# Patient Record
Sex: MALE | URBAN - METROPOLITAN AREA
[De-identification: names, ages, dates, MRNs, and addresses within clinical notes are randomized per-mention and may not be internally consistent; named-entity substitution may affect disease eponyms.]

---

## 2020-03-06 ENCOUNTER — OFFICE VISIT (OUTPATIENT)
Dept: URBAN - METROPOLITAN AREA CLINIC 17 | Facility: CLINIC | Age: 75
End: 2020-03-06
Payer: MEDICARE

## 2020-03-06 PROCEDURE — 76514 ECHO EXAM OF EYE THICKNESS: CPT | Performed by: OPHTHALMOLOGY

## 2020-03-06 PROCEDURE — 92133 CPTRZD OPH DX IMG PST SGM ON: CPT | Performed by: OPHTHALMOLOGY

## 2020-03-06 PROCEDURE — 92004 COMPRE OPH EXAM NEW PT 1/>: CPT | Performed by: OPHTHALMOLOGY

## 2020-03-06 PROCEDURE — 92020 GONIOSCOPY: CPT | Performed by: OPHTHALMOLOGY

## 2020-03-06 RX ORDER — PREDNISOLONE ACETATE 10 MG/ML
1 % SUSPENSION/ DROPS OPHTHALMIC
Qty: 1 | Refills: 1 | Status: INACTIVE
Start: 2020-03-06 | End: 2020-07-13

## 2020-03-06 RX ORDER — OFLOXACIN 3 MG/ML
0.3 % SOLUTION/ DROPS OPHTHALMIC
Qty: 1 | Refills: 1 | Status: INACTIVE
Start: 2020-03-06 | End: 2020-07-13

## 2020-03-06 ASSESSMENT — INTRAOCULAR PRESSURE
OD: 21
OS: 28

## 2020-03-06 NOTE — IMPRESSION/PLAN
Impression: Primary open-angle glaucoma, bilateral, moderate stage: B74.0884. Plan: Pt has Glaucoma    Gonio :  cbb 2+ pg ou       Pachs:   580/550   Today's IOP :    21, 28 Target IOP low to mid teens Pt denies Fhx of Glaucoma Left eye is the better seeing eye No VF today - will warrant in the future C/D:  .8-.8 / .9-.9 OCT: (3/6/2020) 75, 54 Pt denies Sulfa Allergy   // Pt denies Lung /Heart dx Pt is currently using :  Lumigan QHS OU, Dorzo/Timolol BID OU, Brimonidine BID O No previous medications used Plan :
1. Cont : 
- Lumigan QHS OU - Dorzol/Timolol BID OU
- Brimonidine BID OS 2. Patient presents today transitioning into care from Dr Alberto Burrell. Based on today's exam and patients IOP recommend additional treatment. 3. Baerveldt Tube Shunt vs XEN implant was discussed. Risks/benefits from each procedure was explained to patient and wife and were understood. The Patient is aware that the risks of Baerveldt tube shunt include but are not limited to complete blindness , loss of vision and loss of the eye, bleeding, infection, inflammation, Hypotony, Persistent IOP elevation, intractable diplopia , and orbital or ocular inflammation. The Patient is aware that there is a 10 % chance of permanent double vision which cannot be remedied by any means including glasses or strabismus surgery. The patient is aware that failure to lower IOP will likely lead to progressive sight loss and possibly blindness . Pt understands. * 50 minutes/points with turn over **PCC's Please notify OR to order device 4.  Schedule Baerveldt  OS  ****Risk Level: 2****

## 2020-04-23 ENCOUNTER — OFFICE VISIT (OUTPATIENT)
Dept: URBAN - METROPOLITAN AREA CLINIC 17 | Facility: CLINIC | Age: 75
End: 2020-04-23
Payer: MEDICARE

## 2020-04-23 PROCEDURE — 92083 EXTENDED VISUAL FIELD XM: CPT | Performed by: OPHTHALMOLOGY

## 2020-07-28 ENCOUNTER — SURGERY (OUTPATIENT)
Dept: URBAN - METROPOLITAN AREA SURGERY 11 | Facility: SURGERY | Age: 75
End: 2020-07-28
Payer: MEDICARE

## 2020-07-28 PROCEDURE — 66180 AQUEOUS SHUNT EYE W/GRAFT: CPT | Performed by: OPHTHALMOLOGY

## 2020-07-29 ENCOUNTER — POST-OPERATIVE VISIT (OUTPATIENT)
Dept: URBAN - METROPOLITAN AREA CLINIC 23 | Facility: CLINIC | Age: 75
End: 2020-07-29
Payer: MEDICARE

## 2020-07-29 DIAGNOSIS — Z48.810 ENCNTR FOR SURGICAL AFTCR FOL SURGERY ON THE SENSE ORGANS: ICD-10-CM

## 2020-07-29 DIAGNOSIS — Z09 ENCNTR FOR F/U EXAM AFT TRTMT FOR COND OTH THAN MALIG NEOPLM: Primary | ICD-10-CM

## 2020-07-29 PROCEDURE — 99024 POSTOP FOLLOW-UP VISIT: CPT | Performed by: OPTOMETRIST

## 2020-07-29 ASSESSMENT — INTRAOCULAR PRESSURE
OD: 17
OS: 24

## 2020-08-05 ENCOUNTER — POST-OPERATIVE VISIT (OUTPATIENT)
Dept: URBAN - METROPOLITAN AREA CLINIC 23 | Facility: CLINIC | Age: 75
End: 2020-08-05
Payer: MEDICARE

## 2020-08-05 PROCEDURE — 99024 POSTOP FOLLOW-UP VISIT: CPT | Performed by: OPHTHALMOLOGY

## 2020-08-05 RX ORDER — ERYTHROMYCIN 5 MG/G
OINTMENT OPHTHALMIC
Qty: 1 | Refills: 0 | Status: INACTIVE
Start: 2020-08-05 | End: 2020-08-18

## 2020-08-05 ASSESSMENT — INTRAOCULAR PRESSURE: OS: 23

## 2020-08-05 NOTE — IMPRESSION/PLAN
Impression: Primary open-angle glaucoma, bilateral, moderate stage: H40.1132.
-- Baerveldt Tube OS 0- 07/28/2020 - Dr Milagros Chris - Status: Not open  Plan: Pt has Glaucoma    Gonio :  cbb 2+ pg ou       Pachs:   580/550   Today's IOP :     23 OS Target IOP low to mid teens Pt denies Fhx of Glaucoma Left eye is the better seeing eye No VF today - will warrant in the future C/D:  .8-.8 / .9-.9 OCT: (3/6/2020) 75, 54 Pt denies Sulfa Allergy   // Pt denies Lung /Heart dx Pt is currently using :  Lumigan QHS OU, Dorzo/Timolol BID OU, Brimonidine BID O No previous medications used Plan :
1. Cont : 
- Lumigan QHS OU - Dorzol/Timolol BID OU
- Brimonidine BID OS
- Prednisolone QID OS
- STOP Ofloxacin 2. Patient is doing well post Baerveldt Tube ; healing well and will 3. Post-Op instructions were reviewed with patient and wife. No heavy lifting bending or straining for 3 more weeks. No rubbing the upper lid. Emphasized compliance with drops and instructions.  
4. Return in 3 weeks

## 2020-08-18 ENCOUNTER — POST-OPERATIVE VISIT (OUTPATIENT)
Dept: URBAN - METROPOLITAN AREA CLINIC 23 | Facility: CLINIC | Age: 75
End: 2020-08-18
Payer: MEDICARE

## 2020-08-18 PROCEDURE — 99024 POSTOP FOLLOW-UP VISIT: CPT | Performed by: OPHTHALMOLOGY

## 2020-08-18 ASSESSMENT — INTRAOCULAR PRESSURE: OS: 19

## 2020-08-18 NOTE — IMPRESSION/PLAN
Impression: Primary open-angle glaucoma, bilateral, moderate stage: H40.1132.
-- Baerveldt Tube OS 0- 07/28/2020 - Dr Dwayne Scheuermann - Status: Not open Plan: Pt has Glaucoma    Gonio :  cbb 2+ pg ou       Pachs:   580/550   Today's IOP :     19 OS Target IOP low to mid teens Pt denies Fhx of Glaucoma Left eye is the better seeing eye No VF today - will warrant in the future C/D:  .8-.8 / .9-.9 OCT: (3/6/2020) 75, 54 Pt denies Sulfa Allergy   // Pt denies Lung /Heart dx Pt is currently using :  Lumigan QHS OU, Dorzo/Timolol BID OU, Brimonidine BID O No previous medications used Plan :
1. Cont : 
- Lumigan QHS OU - Dorzol/Timolol BID OU
- Brimonidine BID OS
- Prednisolone QID OS
- STOP Ofloxacin 2. Patient is doing well post Baerveldt Tube ; healing well and will, Not opened as of 8/18/20 3. Post-Op instructions were reviewed with patient and wife. No heavy lifting bending or straining for 3 more weeks. No rubbing the upper lid. Emphasized compliance with drops and instructions.  
4. Return in 3 weeks

## 2020-09-03 ENCOUNTER — POST-OPERATIVE VISIT (OUTPATIENT)
Dept: URBAN - METROPOLITAN AREA CLINIC 23 | Facility: CLINIC | Age: 75
End: 2020-09-03
Payer: MEDICARE

## 2020-09-03 PROCEDURE — 99024 POSTOP FOLLOW-UP VISIT: CPT | Performed by: OPHTHALMOLOGY

## 2020-09-03 RX ORDER — DORZOLAMIDE HYDROCHLORIDE AND TIMOLOL MALEATE 20; 5 MG/ML; MG/ML
SOLUTION/ DROPS OPHTHALMIC
Qty: 3 | Refills: 3 | Status: INACTIVE
Start: 2020-09-03 | End: 2021-09-09

## 2020-09-03 RX ORDER — BIMATOPROST 0.1 MG/ML
0.01 % SOLUTION/ DROPS OPHTHALMIC
Qty: 3 | Refills: 3 | Status: INACTIVE
Start: 2020-09-03 | End: 2021-03-30

## 2020-09-03 ASSESSMENT — INTRAOCULAR PRESSURE
OS: 7
OD: 15

## 2020-09-03 NOTE — IMPRESSION/PLAN
Impression: Primary open-angle glaucoma, bilateral, moderate stage: H40.1132.
-- Baerveldt Tube OS 0- 07/28/2020 - Dr Christina Dakins - Status: Open Plan: Pt has Glaucoma    Gonio :  cbb 2+ pg ou       Pachs:   580/550   Today's IOP : 15, 7 Target IOP low to mid teens Pt denies Fhx of Glaucoma Left eye is the better seeing eye No VF today - will warrant in the future C/D:  .8-.8 / .9-.9 OCT: (3/6/2020) 75, 54 Pt denies Sulfa Allergy   // Pt denies Lung /Heart dx Pt is currently using :  Lumigan QHS OU, Dorzo/Timolol BID OU, Brimonidine BID O No previous medications used Plan :
1. Cont : 
Lumigan QHS OU Dorzol/Timolol BID OU
STOP Brimonidine BID OS
DECREASE Prednisolone to TID OS for 2 weeks then BID for 2 weeks then HOLD at BID until next appt. 2. Patient is doing well post Baerveldt Tube ; healing well and will,  opened as of 09/03/2020 3. Return in 1 month.

## 2020-10-06 ENCOUNTER — POST-OPERATIVE VISIT (OUTPATIENT)
Dept: URBAN - METROPOLITAN AREA CLINIC 23 | Facility: CLINIC | Age: 75
End: 2020-10-06

## 2020-10-06 PROCEDURE — 99024 POSTOP FOLLOW-UP VISIT: CPT | Performed by: OPHTHALMOLOGY

## 2020-10-06 ASSESSMENT — INTRAOCULAR PRESSURE
OS: 16
OD: 17

## 2020-10-06 NOTE — IMPRESSION/PLAN
Impression: Primary open-angle glaucoma, bilateral, moderate stage: H40.1132.
-- Baerveldt Tube OS 0- 07/28/2020 - Dr Lucia White - Status: Open Plan: Pt has Glaucoma    Gonio :  cbb 2+ pg ou       Pachs:   580/550   Today's IOP : 17/16 TMax 34/32 Target IOP low to mid teens Pt denies Fhx of Glaucoma Left eye is the better seeing eye No VF today - will warrant in the future C/D:  .8-.8 / .9-.9 OCT: (3/6/2020) 75, 54 Pt denies Sulfa Allergy   // Pt denies Lung /Heart dx Pt is currently using :  Lumigan QHS OU, Dorzo/Timolol BID OU, Brimonidine BID O No previous medications used Plan :
1. Cont : 
Lumigan QHS OU Dorzol/Timolol BID OU
DECREASE Prednisolone to QD OS for 1 weeks then  D/C 
2. Patient is doing well post Baerveldt Tube ; healing well and will,  opened as of 09/03/2020 3. Return in 1 month.

## 2020-10-29 ENCOUNTER — OFFICE VISIT (OUTPATIENT)
Dept: URBAN - METROPOLITAN AREA CLINIC 31 | Facility: CLINIC | Age: 75
End: 2020-10-29
Payer: MEDICARE

## 2020-10-29 PROCEDURE — 92012 INTRM OPH EXAM EST PATIENT: CPT | Performed by: OPHTHALMOLOGY

## 2020-10-29 PROCEDURE — 92134 CPTRZ OPH DX IMG PST SGM RTA: CPT | Performed by: OPHTHALMOLOGY

## 2020-10-29 PROCEDURE — 67028 INJECTION EYE DRUG: CPT | Performed by: OPHTHALMOLOGY

## 2020-10-29 ASSESSMENT — INTRAOCULAR PRESSURE
OS: 13
OD: 11

## 2020-10-29 NOTE — IMPRESSION/PLAN
Impression: Glaucoma: H40.9.
s/p SLT OS
-s/p glaucoma tube Plan: Followed by Dr. Marie Winston. IOP improved today. Continue with drops.   Recommend follow up with Dr. Marie Winston

## 2020-10-29 NOTE — IMPRESSION/PLAN
Impression: Puckering of macula, bilateral: H35.373. OU.
- OD: ERM/VMT: -s/p  23g PPV/MP/ICG/ILM peel/ Avastin (no IVK due to glaucoma) x ERM and VMT OD (05/21/19)SI
- OS: s/p PPV, MP (~2008, Dr. Pantera Araya) -> stable with atrophy Plan: Patient notes blurring OU. Exam and OCT reveal ERM peeled. Retina remains attached. Stable DME superiorly.  Recommend observation today

## 2020-10-29 NOTE — IMPRESSION/PLAN
Impression: Type 2 diab with severe nonp rtnop with macular edema, r eye: E11.3411. OD. Status: Symptomatic.
-s/p Avastin last 08/27/20 Plan: Exam and OCT reveal improving DME, s/p Avastin. Patient also notes subjective improvement in the vision, though it still remains 20/200. Recommend Avastin injection today. R/B/A's discussed. Patient elects to proceed. May consider extending if vision effect plateaus. Avastin performed today without complication OD. 

RTC: 6 weeks re-eval with OCT,OU/poss Avastin

## 2020-11-03 ENCOUNTER — OFFICE VISIT (OUTPATIENT)
Dept: URBAN - METROPOLITAN AREA CLINIC 23 | Facility: CLINIC | Age: 75
End: 2020-11-03

## 2020-11-03 DIAGNOSIS — H40.1132 PRIMARY OPEN-ANGLE GLAUCOMA, BILATERAL, MODERATE STAGE: Primary | ICD-10-CM

## 2020-11-03 PROCEDURE — 99024 POSTOP FOLLOW-UP VISIT: CPT | Performed by: OPHTHALMOLOGY

## 2020-11-03 ASSESSMENT — INTRAOCULAR PRESSURE
OS: 18
OD: 15

## 2020-11-03 NOTE — IMPRESSION/PLAN
Impression: Primary open-angle glaucoma, bilateral, moderate stage: H40.1132.
-- Baerveldt Tube OS 0- 07/28/2020 - Dr Derek Jasmine - Status: Open Plan: Pt has Glaucoma    Gonio :  cbb 2+ pg ou   Pachs: 580/550 Today's IOP :15,18 Target IOP low to mid teens Pt denies Fhx of Glaucoma Left eye is the better seeing eye No VF today - will warrant in the future C/D:  .8-.8 / .9-.9 OCT: (3/6/2020) 75, 54 Pt denies Sulfa Allergy   // Pt denies Lung /Heart dx Pt is currently using :  Lumigan QHS OU, Dorzo/Timolol BID OU, Brimonidine BID O No previous medications used Plan :
1. Cont : 
Lumigan QHS OU Dorzol/Timolol BID OU
RESTART Brimonidine BID OS. 2. Patient is doing well, IOP is slightly elevated post Baerveldt Tube 3. Return in 6-8 weeks.

## 2020-12-10 ENCOUNTER — OFFICE VISIT (OUTPATIENT)
Dept: URBAN - METROPOLITAN AREA CLINIC 31 | Facility: CLINIC | Age: 75
End: 2020-12-10
Payer: MEDICARE

## 2020-12-10 DIAGNOSIS — H35.373 PUCKERING OF MACULA, BILATERAL: ICD-10-CM

## 2020-12-10 PROCEDURE — 92134 CPTRZ OPH DX IMG PST SGM RTA: CPT | Performed by: OPHTHALMOLOGY

## 2020-12-10 PROCEDURE — 67028 INJECTION EYE DRUG: CPT | Performed by: OPHTHALMOLOGY

## 2020-12-10 PROCEDURE — 99214 OFFICE O/P EST MOD 30 MIN: CPT | Performed by: OPHTHALMOLOGY

## 2020-12-10 ASSESSMENT — INTRAOCULAR PRESSURE
OS: 13
OD: 10

## 2020-12-10 NOTE — IMPRESSION/PLAN
Impression: Glaucoma: H40.9.
s/p SLT OS
-s/p glaucoma tube Plan: Followed by Dr. Court Farnsworth. IOP improved today. Continue with drops.   Recommend follow up with Dr. Court Farnsworth

## 2020-12-10 NOTE — IMPRESSION/PLAN
Impression: Type 2 diab with prolif diab rtnop with macular edema, bi: [id number].
-s/p Avastin OD 10/29/2020 Plan: OD: Exam and OCT reveal improving DME, s/p Avastin. Patient also notes subjective improvement in the vision, though it still remains 20/200. Recommend Avastin injection today and will extend to 8 wks. R/B/A's discussed. Patient elects to proceed. Avastin performed today without complication OD. OS: Vision tested worse today OS. Exam and OCT demonstrate DME slight worse. Discussed R/B/A of focal laser vs antiVEGF vs observation. Rec close observation for now.  

RTC: 8 weeks re-eval with OCT,OU/poss Avastin

## 2020-12-10 NOTE — IMPRESSION/PLAN
Impression: Puckering of macula, bilateral: H35.373. OU.
- OD: ERM/VMT: -s/p  23g PPV/MP/ICG/ILM peel/ Avastin (no IVK due to glaucoma) x ERM and VMT OD (05/21/19)SI
- OS: s/p PPV, MP (~2008, Dr. Katerin Rivera) -> stable with atrophy Plan: Patient notes blurring OU. Exam and OCT reveal ERM peeled. Retina remains attached. Stable DME superiorly.  Recommend observation today

## 2021-01-12 ENCOUNTER — OFFICE VISIT (OUTPATIENT)
Dept: URBAN - METROPOLITAN AREA CLINIC 29 | Facility: CLINIC | Age: 76
End: 2021-01-12
Payer: MEDICARE

## 2021-01-12 DIAGNOSIS — H20.022 RECURRENT ACUTE IRIDOCYCLITIS, LEFT EYE: ICD-10-CM

## 2021-01-12 PROCEDURE — 99214 OFFICE O/P EST MOD 30 MIN: CPT | Performed by: OPHTHALMOLOGY

## 2021-01-12 RX ORDER — PREDNISOLONE ACETATE 10 MG/ML
1 % SUSPENSION/ DROPS OPHTHALMIC
Qty: 10 | Refills: 1 | Status: INACTIVE
Start: 2021-01-12 | End: 2021-09-02

## 2021-01-12 ASSESSMENT — INTRAOCULAR PRESSURE
OD: 17
OS: 13

## 2021-01-12 NOTE — IMPRESSION/PLAN
Impression: Primary open-angle glaucoma, bilateral, moderate stage: H40.1132.
-- Baerveldt Tube OS 0- 07/28/2020 - Dr Rejeana Litten - Status: Open Plan: Pt has Glaucoma    Gonio :  cbb 2+ pg ou   Pachs: 580/550 Today's IOP : 17, 13 Target IOP low to mid teens Pt denies Fhx of Glaucoma Left eye is the better seeing eye No VF today - will warrant in the future C/D:  .8-.8 / .9-.9 OCT: (3/6/2020) 75, 54 Pt denies Sulfa Allergy   // Pt denies Lung /Heart dx Pt is currently using :  Lumigan QHS OU, Dorzo/Timolol BID OU, Brimonidine BID O No previous medications used Plan :
1. Cont : 
Lumigan QHS OU Dorzol/Timolol BID OU Brimonidine BID OS
START:
Pred BID OS 2. Patient is doing well however there is inflammation present upon today's exam. Will add steroid and return for a/c check. 
3. Return in 2-3 weeks for IOP check

## 2021-01-12 NOTE — IMPRESSION/PLAN
Impression: Recurrent acute iridocyclitis, left eye: H20.022. Plan: Patient presents with KP on K. Will begin Pred BID OS.  In the future if continues will need to remove PGA's

## 2021-02-02 ENCOUNTER — OFFICE VISIT (OUTPATIENT)
Dept: URBAN - METROPOLITAN AREA CLINIC 23 | Facility: CLINIC | Age: 76
End: 2021-02-02
Payer: MEDICARE

## 2021-02-02 PROCEDURE — 99213 OFFICE O/P EST LOW 20 MIN: CPT | Performed by: OPHTHALMOLOGY

## 2021-02-02 ASSESSMENT — INTRAOCULAR PRESSURE
OS: 13
OD: 17

## 2021-02-02 NOTE — IMPRESSION/PLAN
Impression: Primary open-angle glaucoma, bilateral, moderate stage: H40.1132.
-- Baerveldt Tube OS 0- 07/28/2020 - Dr Keyon Neville - Status: Open Plan: Pt has Glaucoma    Gonio :  cbb 2+ pg ou   Pachs: 580/550 Today's IOP : 17/13 Again Target IOP low to mid teens Pt denies Fhx of Glaucoma Left eye is the better seeing eye No VF today - will warrant in the future C/D:  .8-.8 / .9-.9 OCT: (3/6/2020) 75, 54 Pt denies Sulfa Allergy   // Pt denies Lung /Heart dx Pt is currently using :  Lumigan QHS OU, Dorzo/Timolol BID OU, Brimonidine BID OS, Pred TID OS No previous medications used Plan :
1. Cont : 
Lumigan QHS OU Dorzol/Timolol BID OU Brimonidine BID OS Pred OS BID x 2 weeks then Decrease to QD x 2 weeks 2. Patient is doing well however there is inflammation present upon today's exam. Will add steroid and return for a/c check. 
3. Return in 4 weeks for IOP check

## 2021-03-25 ENCOUNTER — OFFICE VISIT (OUTPATIENT)
Dept: URBAN - METROPOLITAN AREA CLINIC 31 | Facility: CLINIC | Age: 76
End: 2021-03-25
Payer: MEDICARE

## 2021-03-25 DIAGNOSIS — E11.3513 TYPE 2 DIAB WITH PROLIF DIAB RTNOP WITH MACULAR EDEMA, BI: Primary | ICD-10-CM

## 2021-03-25 DIAGNOSIS — Z96.1 PRESENCE OF INTRAOCULAR LENS: ICD-10-CM

## 2021-03-25 PROCEDURE — 92014 COMPRE OPH EXAM EST PT 1/>: CPT | Performed by: OPHTHALMOLOGY

## 2021-03-25 PROCEDURE — 67028 INJECTION EYE DRUG: CPT | Performed by: OPHTHALMOLOGY

## 2021-03-25 PROCEDURE — 92134 CPTRZ OPH DX IMG PST SGM RTA: CPT | Performed by: OPHTHALMOLOGY

## 2021-03-25 ASSESSMENT — INTRAOCULAR PRESSURE
OS: 13
OD: 13

## 2021-03-25 NOTE — IMPRESSION/PLAN
Impression: Type 2 diab with prolif diab rtnop with macular edema, bi: [id number].
-s/p Avastin OD 12/10/2020 Plan: OD: Exam and OCT reveal improving DME, s/p Avastin. Patient also notes subjective improvement in the vision, though it still remains 20/200. Recommend Avastin injection today and will extend to 12 wks. R/B/A's discussed. Patient elects to proceed. Avastin performed today without complication OD. OS: Vision tested worse today OS. Exam and OCT demonstrate DME improved after healing from glaucoma surgery. Discussed R/B/A of focal laser vs antiVEGF vs observation. Rec close observation for now.  

RTC: 12 weeks re-eval with OCT,OU/poss Avastin

## 2021-03-25 NOTE — IMPRESSION/PLAN
Impression: Puckering of macula, bilateral: H35.373. OU.
- OD: ERM/VMT: -s/p  23g PPV/MP/ICG/ILM peel/ Avastin (no IVK due to glaucoma) x ERM and VMT OD (05/21/19)SI
- OS: s/p PPV, MP (~2008, Dr. Dionicio Aviles) -> stable with atrophy Plan: Patient notes blurring OU. Exam and OCT reveal ERM peeled. Retina remains attached.  Recommend observation today

## 2021-03-25 NOTE — IMPRESSION/PLAN
Impression: Glaucoma: H40.9.
s/p SLT OS
-s/p glaucoma tube Plan: Followed by Dr. Christina Dakins. IOP improved today. Continue with drops.   Recommend follow up with Dr. Christina Dakins

## 2021-03-30 ENCOUNTER — OFFICE VISIT (OUTPATIENT)
Dept: URBAN - METROPOLITAN AREA CLINIC 23 | Facility: CLINIC | Age: 76
End: 2021-03-30
Payer: MEDICARE

## 2021-03-30 PROCEDURE — 99214 OFFICE O/P EST MOD 30 MIN: CPT | Performed by: OPHTHALMOLOGY

## 2021-03-30 RX ORDER — LATANOPROST 50 UG/ML
0.005 % SOLUTION OPHTHALMIC
Qty: 7.5 | Refills: 3 | Status: INACTIVE
Start: 2021-03-30 | End: 2022-03-01

## 2021-03-30 ASSESSMENT — INTRAOCULAR PRESSURE
OD: 18
OS: 16

## 2021-03-30 NOTE — IMPRESSION/PLAN
Impression: Primary open-angle glaucoma, bilateral, moderate stage: H40.1132.
-- Baerveldt Tube OS 0- 07/28/2020 - Dr Olivier Hoskins - Status: Open Plan: Pt has Glaucoma    Gonio :  cbb 2+ pg ou   Pachs: 580/550 Today's IOP : 18, 16 Target IOP low to mid teens Pt denies Fhx of Glaucoma Left eye is the better seeing eye No VF today - will warrant in the future C/D:  .6-.6 / .8-.8 OCT: (3/6/2020) 75, 54 Pt denies Sulfa Allergy   // Pt denies Lung /Heart dx Plan :
1. Cont : 
Lumigan QHS OU - CHANGE to Latanoprost QHS OU 2/2 price. Dorzol/Timolol BID OU Brimonidine BID OS 2.  Patient is doing well ; IOP is optimal.
3. Return in 2 months for IOP check with VF, RNFL

## 2021-06-17 ENCOUNTER — OFFICE VISIT (OUTPATIENT)
Dept: URBAN - METROPOLITAN AREA CLINIC 31 | Facility: CLINIC | Age: 76
End: 2021-06-17
Payer: MEDICARE

## 2021-06-17 PROCEDURE — 99214 OFFICE O/P EST MOD 30 MIN: CPT | Performed by: OPHTHALMOLOGY

## 2021-06-17 PROCEDURE — 92134 CPTRZ OPH DX IMG PST SGM RTA: CPT | Performed by: OPHTHALMOLOGY

## 2021-06-17 ASSESSMENT — INTRAOCULAR PRESSURE
OD: 17
OS: 15

## 2021-06-17 NOTE — IMPRESSION/PLAN
Impression: Puckering of macula, bilateral: H35.373. OU.
- OD: ERM/VMT: -s/p  23g PPV/MP/ICG/ILM peel/ Avastin (no IVK due to glaucoma) x ERM and VMT OD (05/21/19)SI
- OS: s/p PPV, MP (~2008, Dr. Durán Na) -> stable with atrophy Plan: Patient notes blurring OU. Exam and OCT reveal ERM peeled. Retina remains attached.  Recommend observation today

## 2021-06-17 NOTE — IMPRESSION/PLAN
Impression: Type 2 diab with prolif diab rtnop with macular edema, bi: [id number].
-s/p Avastin OD last 03/25/2021 Plan: OD: Exam and OCT reveal improving DME, s/p Avastin. Patient now does not have subjective improvement in the vision after each injection. Vision remains 20/400. Discussed r/b/a of tx vs observation. Patient elects observation. OS: Vision tested worse today OS. Exam and OCT demonstrate DME improved after healing from glaucoma surgery. Discussed R/B/A of focal laser vs antiVEGF vs observation. Rec close observation for now.  

RTC: 12 weeks re-eval with OCT,OU/poss Avastin

## 2021-06-17 NOTE — IMPRESSION/PLAN
Impression: Glaucoma: H40.9.
s/p SLT OS
-s/p glaucoma tube Plan: IOP improved today. Continue with drops.   Recommend follow up with Dr. Lucia White

## 2021-08-23 ENCOUNTER — TESTING ONLY (OUTPATIENT)
Dept: URBAN - METROPOLITAN AREA CLINIC 23 | Facility: CLINIC | Age: 76
End: 2021-08-23
Payer: MEDICARE

## 2021-09-02 ENCOUNTER — OFFICE VISIT (OUTPATIENT)
Dept: URBAN - METROPOLITAN AREA CLINIC 23 | Facility: CLINIC | Age: 76
End: 2021-09-02
Payer: MEDICARE

## 2021-09-02 PROCEDURE — 99213 OFFICE O/P EST LOW 20 MIN: CPT | Performed by: OPHTHALMOLOGY

## 2021-09-02 PROCEDURE — 92083 EXTENDED VISUAL FIELD XM: CPT | Performed by: OPHTHALMOLOGY

## 2021-09-02 PROCEDURE — 92133 CPTRZD OPH DX IMG PST SGM ON: CPT | Performed by: OPHTHALMOLOGY

## 2021-09-02 ASSESSMENT — INTRAOCULAR PRESSURE
OD: 16
OS: 16

## 2021-09-02 NOTE — IMPRESSION/PLAN
Impression: Primary open-angle glaucoma, bilateral, moderate stage: H40.1132.
-- Baerveldt Tube OS 0- 07/28/2020 - Dr Milagros Chris - Status: Open Plan: Pt has Glaucoma    Gonio :  cbb 2+ pg ou   Pachs: 580/550 Today's IOP : 16/ 16 Target IOP low to mid teens Pt denies Fhx of Glaucoma Left eye is the better seeing eye VF  OD: Nasal step with scattered defects OS Inferior nasal step 8/23/21 C/D:  .6-.6 / .8-.8 OCT: 66/64  9/2/21 Pt denies Sulfa Allergy   // Pt denies Lung /Heart dx Plan :
1. Cont : 
Lumigan QHS OU - CHANGE to Latanoprost QHS OU 2/2 price. Dorzol/Timolol BID OU Brimonidine BID OS 2.  Patient is doing well ; IOP is optimal.
3. Return in 6  months for IOP check

## 2021-09-02 NOTE — IMPRESSION/PLAN
Impression: Puckering of macula, bilateral: H35.373.  OU.
- OD: ERM/VMT: -s/p  23g PPV/MP/ICG/ILM peel/ Avastin (no IVK due to glaucoma) x ERM and VMT OD (05/21/19)SI
- OS: s/p PPV, MP (~2008, Dr. Katerin Rivera) -> stable with atrophy Plan: Patient to continue care with Dr. Boston Malone

## 2021-09-23 ENCOUNTER — OFFICE VISIT (OUTPATIENT)
Dept: URBAN - METROPOLITAN AREA CLINIC 31 | Facility: CLINIC | Age: 76
End: 2021-09-23
Payer: MEDICARE

## 2021-09-23 DIAGNOSIS — E11.3411 TYPE 2 DIABETES MELLITUS WITH SEVERE NONPROLIFERATIVE DIABETIC RETINOPATHY WITH MACULAR EDEMA, RIGHT EYE: ICD-10-CM

## 2021-09-23 PROCEDURE — 92012 INTRM OPH EXAM EST PATIENT: CPT | Performed by: OPHTHALMOLOGY

## 2021-09-23 PROCEDURE — 67028 INJECTION EYE DRUG: CPT | Performed by: OPHTHALMOLOGY

## 2021-09-23 PROCEDURE — 92134 CPTRZ OPH DX IMG PST SGM RTA: CPT | Performed by: OPHTHALMOLOGY

## 2021-09-23 ASSESSMENT — INTRAOCULAR PRESSURE
OS: 19
OD: 15

## 2021-09-23 NOTE — IMPRESSION/PLAN
Impression: Glaucoma: H40.9.
s/p SLT OS
-s/p glaucoma tube Plan: IOP improved today. Continue with drops.   Recommend follow up with Dr. Yashira Mckinley

## 2021-09-23 NOTE — IMPRESSION/PLAN
Impression: Type 2 diab with prolif diab rtnop with macular edema, bi: [id number].
-s/p Avastin OD last 03/25/2021 Plan: OD: Exam and OCT reveal improving DME, s/p Avastin. Patient now does not have subjective improvement in the vision after each injection. Vision remains 20/400. Discussed r/b/a of tx vs observation. Patient elects observation. OS: Vision tested worse today OS. Exam and OCT demonstrate DME improved after healing from glaucoma surgery. Discussed R/B/A of focal laser vs antiVEGF vs observation. Rec close observation for now.  

RTC: 12 weeks re-eval with OCT,OU/poss Avastin

## 2021-12-16 ENCOUNTER — OFFICE VISIT (OUTPATIENT)
Dept: URBAN - METROPOLITAN AREA CLINIC 31 | Facility: CLINIC | Age: 76
End: 2021-12-16
Payer: MEDICARE

## 2021-12-16 DIAGNOSIS — H40.9 GLAUCOMA: ICD-10-CM

## 2021-12-16 PROCEDURE — 99214 OFFICE O/P EST MOD 30 MIN: CPT | Performed by: OPHTHALMOLOGY

## 2021-12-16 PROCEDURE — 92134 CPTRZ OPH DX IMG PST SGM RTA: CPT | Performed by: OPHTHALMOLOGY

## 2021-12-16 ASSESSMENT — INTRAOCULAR PRESSURE
OS: 13
OD: 12

## 2021-12-16 NOTE — IMPRESSION/PLAN
Impression: Type 2 diab with prolif diab rtnop with macular edema, bi: [id number].
-s/p Avastin OD last 03/25/2021
- wife recently diagnosed with possible Ovarian cancer and is undergoing chemo. Plan: OD: Exam and OCT reveal improving DME, s/p Avastin. Patient now does not have subjective improvement in the vision after each injection. Vision remains 20/400. Discussed r/b/a of tx vs observation. Patient elects observation. OS: Vision tested worse today OS. Exam and OCT demonstrate DME improved after healing from glaucoma surgery. Discussed R/B/A of focal laser vs antiVEGF vs observation. Rec close observation for now.  

RTC: 12 weeks re-eval with OCT,OU/poss Avastin

## 2021-12-16 NOTE — IMPRESSION/PLAN
Impression: Glaucoma: H40.9.
s/p SLT OS
-s/p glaucoma tube Plan: IOP improved today. Continue with drops.   Recommend follow up with Dr. Agustin Temple

## 2022-03-01 ENCOUNTER — OFFICE VISIT (OUTPATIENT)
Dept: URBAN - METROPOLITAN AREA CLINIC 23 | Facility: CLINIC | Age: 77
End: 2022-03-01
Payer: MEDICARE

## 2022-03-01 PROCEDURE — 99213 OFFICE O/P EST LOW 20 MIN: CPT | Performed by: OPHTHALMOLOGY

## 2022-03-01 RX ORDER — DORZOLAMIDE HYDROCHLORIDE AND TIMOLOL MALEATE 20; 5 MG/ML; MG/ML
SOLUTION/ DROPS OPHTHALMIC
Qty: 7.5 | Refills: 3 | Status: ACTIVE
Start: 2022-03-01

## 2022-03-01 RX ORDER — LATANOPROST 50 UG/ML
0.005 % SOLUTION OPHTHALMIC
Qty: 8 | Refills: 3 | Status: ACTIVE
Start: 2022-03-01

## 2022-03-01 ASSESSMENT — INTRAOCULAR PRESSURE
OD: 17
OS: 17

## 2022-03-01 NOTE — IMPRESSION/PLAN
Impression: Type 2 diab with prolif diab rtnop with macular edema, bi: [id number].
-s/p Avastin OD last 03/25/2021
- wife recently diagnosed with possible Ovarian cancer and is undergoing chemo.  Plan: Patient to continue care with Dr. Pantera Stanley

## 2022-03-01 NOTE — IMPRESSION/PLAN
Impression: Primary open-angle glaucoma, bilateral, moderate stage: H40.1132.
-- Baerveldt Tube OS 0- 07/28/2020 - Dr Olivier Hoskins - Status: Open Plan: Pt has Glaucoma    Gonio :  cbb 2+ pg ou   Pachs: 580/550 Today's IOP : 17/17 Target IOP low to mid teens Pt denies Fhx of Glaucoma Left eye is the better seeing eye VF  OD: Nasal step with scattered defects OS Inferior nasal step 8/23/21 C/D:  .6-.6 / .8-.8 OCT: 66/64  9/2/21 Pt denies Sulfa Allergy   // Pt denies Lung /Heart dx Plan :
1. Cont : 
Lumigan QHS OU - CHANGE to Latanoprost QHS OU 2/2 price. Dorzol/Timolol BID OU Brimonidine BID OS 2.  Patient is doing well ; IOP is optimal.
3. Return in 6  months for IOP check, VF, RNFL DFE (Tech to [employer] and dilate)

## 2022-06-02 ENCOUNTER — OFFICE VISIT (OUTPATIENT)
Dept: URBAN - METROPOLITAN AREA CLINIC 31 | Facility: CLINIC | Age: 77
End: 2022-06-02
Payer: MEDICARE

## 2022-06-02 DIAGNOSIS — H35.373 PUCKERING OF MACULA, BILATERAL: ICD-10-CM

## 2022-06-02 DIAGNOSIS — Z96.1 PRESENCE OF INTRAOCULAR LENS: ICD-10-CM

## 2022-06-02 DIAGNOSIS — H40.9 GLAUCOMA: ICD-10-CM

## 2022-06-02 DIAGNOSIS — E11.3513 TYPE 2 DIAB WITH PROLIF DIAB RTNOP WITH MACULAR EDEMA, BI: Primary | ICD-10-CM

## 2022-06-02 PROCEDURE — 92012 INTRM OPH EXAM EST PATIENT: CPT | Performed by: OPHTHALMOLOGY

## 2022-06-02 PROCEDURE — 92134 CPTRZ OPH DX IMG PST SGM RTA: CPT | Performed by: OPHTHALMOLOGY

## 2022-06-02 ASSESSMENT — INTRAOCULAR PRESSURE
OD: 16
OS: 19

## 2022-06-02 NOTE — IMPRESSION/PLAN
Impression: Glaucoma: H40.9.
s/p SLT OS
-s/p glaucoma tube Plan: IOP improved today. Continue with drops.   Recommend follow up with Dr. Mariah Perez

## 2022-06-02 NOTE — IMPRESSION/PLAN
Impression: Puckering of macula, bilateral: H35.373. OU.
- OD: ERM/VMT: -s/p  23g PPV/MP/ICG/ILM peel/ Avastin (no IVK due to glaucoma) x ERM and VMT OD (05/21/19)SI
- OS: s/p PPV, MP (~2008, Dr. Jillian Cisse) -> stable with atrophy Plan: Patient notes blurring OU. Exam and OCT reveal ERM peeled. Retina remains attached.  Recommend observation today

## 2022-06-02 NOTE — IMPRESSION/PLAN
Impression: Type 2 diab with prolif diab rtnop with macular edema, bi: [id number].
-s/p Avastin OD last 03/25/2021
- wife diagnosed with Ovarian cancer and is undergoing chemo and just had surgery. Plan: OD: Exam and OCT reveal improving DME, s/p Avastin. Patient now does not have subjective improvement in the vision after each injection. Vision improved to 20/100. Discussed r/b/a of tx vs observation. Patient elects observation. OS: Vision tested worse today OS. Exam and OCT demonstrate DME improved after healing from glaucoma surgery. Discussed R/B/A of focal laser vs antiVEGF vs observation. Rec close observation for now.  

RTC: 6 mo re-eval with OCT,OU/poss Avastin

## 2022-07-28 ENCOUNTER — OFFICE VISIT (OUTPATIENT)
Dept: URBAN - METROPOLITAN AREA CLINIC 31 | Facility: CLINIC | Age: 77
End: 2022-07-28
Payer: MEDICARE

## 2022-07-28 DIAGNOSIS — Z96.1 PRESENCE OF INTRAOCULAR LENS: ICD-10-CM

## 2022-07-28 DIAGNOSIS — H35.373 PUCKERING OF MACULA, BILATERAL: ICD-10-CM

## 2022-07-28 DIAGNOSIS — T85.22XA DISPLACEMENT OF INTRAOCULAR LENS, INITIAL ENCOUNTER: Primary | ICD-10-CM

## 2022-07-28 DIAGNOSIS — H40.9 GLAUCOMA: ICD-10-CM

## 2022-07-28 DIAGNOSIS — E11.3513 TYPE 2 DIAB WITH PROLIF DIAB RTNOP WITH MACULAR EDEMA, BI: ICD-10-CM

## 2022-07-28 PROCEDURE — 99214 OFFICE O/P EST MOD 30 MIN: CPT | Performed by: OPHTHALMOLOGY

## 2022-07-28 PROCEDURE — 92134 CPTRZ OPH DX IMG PST SGM RTA: CPT | Performed by: OPHTHALMOLOGY

## 2022-07-28 ASSESSMENT — INTRAOCULAR PRESSURE
OS: 14
OD: 19

## 2022-07-28 NOTE — IMPRESSION/PLAN
Impression: Glaucoma: H40.9.
s/p SLT OS
-s/p glaucoma tube Plan: IOP improved today. Continue with drops.   Recommend follow up with Dr. Nelsy Elizabeth

## 2022-07-28 NOTE — IMPRESSION/PLAN
Impression: Displacement of intraocular lens, initial encounter: T85.22xA. Plan: Patient here for LOV OD. Exam demonstrates IOL is dislocated. Discussed surgery vs observation. Rec trial of updated Mrx with optometry as his wife is going through tx of ovarian cancer. If he is unhappy with this, discussed that we can proceed with PPV. RTC 2-3 mo with OCT OU.

## 2022-07-28 NOTE — IMPRESSION/PLAN
Impression: Puckering of macula, bilateral: H35.373. OU.
- OD: ERM/VMT: -s/p  23g PPV/MP/ICG/ILM peel/ Avastin (no IVK due to glaucoma) x ERM and VMT OD (05/21/19)SI
- OS: s/p PPV, MP (~2008, Dr. Trey Arteaga) -> stable with atrophy Plan: Patient notes blurring OU. Exam and OCT reveal ERM peeled. Retina remains attached.  Recommend observation today

## 2022-07-28 NOTE — IMPRESSION/PLAN
Impression: Presence of intraocular lens: Z96.1 Bilateral. Plan: Lens in good place OS. See plan above for OD.

## 2022-07-28 NOTE — IMPRESSION/PLAN
Impression: Type 2 diab with prolif diab rtnop with macular edema, bi: [id number].
-s/p Avastin OD last 03/25/2021
- wife diagnosed with Ovarian cancer and is undergoing chemo and just had surgery. Plan: OD: Exam and OCT reveal improving DME, s/p Avastin. Patient now does not have subjective improvement in the vision after each injection. Vision improved to 20/100. Discussed r/b/a of tx vs observation. Patient elects observation. OS: Vision tested worse today OS. Exam and OCT demonstrate DME improved after healing from glaucoma surgery. Discussed R/B/A of focal laser vs antiVEGF vs observation. Rec close observation for now.

## 2023-05-03 ENCOUNTER — TESTING ONLY (OUTPATIENT)
Dept: URBAN - METROPOLITAN AREA CLINIC 21 | Facility: CLINIC | Age: 78
End: 2023-05-03
Payer: MEDICARE

## 2023-05-03 DIAGNOSIS — T85.22XA DISPLACEMENT OF INTRAOCULAR LENS, INITIAL ENCOUNTER: Primary | ICD-10-CM

## 2023-06-28 ENCOUNTER — POST-OPERATIVE VISIT (OUTPATIENT)
Facility: LOCATION | Age: 78
End: 2023-06-28
Payer: MEDICARE

## 2023-06-28 DIAGNOSIS — T85.22XA DISPLACEMENT OF INTRAOCULAR LENS, INITIAL ENCOUNTER: Primary | ICD-10-CM

## 2023-06-28 PROCEDURE — 99024 POSTOP FOLLOW-UP VISIT: CPT | Performed by: OPHTHALMOLOGY

## 2023-06-28 ASSESSMENT — INTRAOCULAR PRESSURE
OS: 11
OD: 17

## 2023-06-28 NOTE — IMPRESSION/PLAN
Impression: S/P 25gPPV/IOL explant/ACIOL OD - 1 Day. Displacement of intraocular lens, initial encounter  T85.22XA.  Plan: --Excellent post op course
--Post operative instructions reviewed
--Condition is improving
--No signs or symptoms of infection
--IOP normal

RTC: 1-2 wk POS OS with OCT OU

## 2023-07-12 ENCOUNTER — POST-OPERATIVE VISIT (OUTPATIENT)
Facility: LOCATION | Age: 78
End: 2023-07-12
Payer: MEDICARE

## 2023-07-12 DIAGNOSIS — T85.22XA DISPLACEMENT OF INTRAOCULAR LENS, INITIAL ENCOUNTER: Primary | ICD-10-CM

## 2023-07-12 PROCEDURE — 99024 POSTOP FOLLOW-UP VISIT: CPT | Performed by: OPHTHALMOLOGY

## 2023-07-12 ASSESSMENT — INTRAOCULAR PRESSURE
OD: 12
OS: 14

## 2023-07-12 NOTE — IMPRESSION/PLAN
Impression: S/P 25gPPV/IOL explant/ACIOL OD - 15 Days. Displacement of intraocular lens, initial encounter  T85.22XA. Plan: --Excellent post op course   
--Condition is improving 
--Taper Prednisolone acetate 1% TID x 1 wk, BID x 1wk, QD x 1wk, then stop
--Discontinue Ofloxacin 0.3% RTC: 3 months DFE/OCT OU

## 2023-09-28 ENCOUNTER — OFFICE VISIT (OUTPATIENT)
Dept: URBAN - METROPOLITAN AREA CLINIC 23 | Facility: CLINIC | Age: 78
End: 2023-09-28
Payer: MEDICARE

## 2023-09-28 DIAGNOSIS — H40.1132 PRIMARY OPEN-ANGLE GLAUCOMA, BILATERAL, MODERATE STAGE: Primary | ICD-10-CM

## 2023-09-28 DIAGNOSIS — E11.3513 TYPE 2 DIAB WITH PROLIF DIAB RTNOP WITH MACULAR EDEMA, BI: ICD-10-CM

## 2023-09-28 PROCEDURE — 92083 EXTENDED VISUAL FIELD XM: CPT | Performed by: OPHTHALMOLOGY

## 2023-09-28 PROCEDURE — 92133 CPTRZD OPH DX IMG PST SGM ON: CPT | Performed by: OPHTHALMOLOGY

## 2023-09-28 PROCEDURE — 99214 OFFICE O/P EST MOD 30 MIN: CPT | Performed by: OPHTHALMOLOGY

## 2023-09-28 ASSESSMENT — INTRAOCULAR PRESSURE
OS: 10
OD: 10

## 2023-09-28 ASSESSMENT — KERATOMETRY
OD: 45.75
OS: 46.38